# Patient Record
Sex: MALE | Race: WHITE | NOT HISPANIC OR LATINO | URBAN - METROPOLITAN AREA
[De-identification: names, ages, dates, MRNs, and addresses within clinical notes are randomized per-mention and may not be internally consistent; named-entity substitution may affect disease eponyms.]

---

## 2017-02-06 ENCOUNTER — EMERGENCY (EMERGENCY)
Facility: HOSPITAL | Age: 47
LOS: 1 days | Discharge: HOME | End: 2017-02-06
Attending: EMERGENCY MEDICINE | Admitting: EMERGENCY MEDICINE
Payer: MEDICARE

## 2017-02-06 VITALS
HEART RATE: 105 BPM | OXYGEN SATURATION: 95 % | SYSTOLIC BLOOD PRESSURE: 129 MMHG | DIASTOLIC BLOOD PRESSURE: 84 MMHG | WEIGHT: 130.07 LBS | RESPIRATION RATE: 16 BRPM | TEMPERATURE: 98 F

## 2017-02-06 DIAGNOSIS — Z88.0 ALLERGY STATUS TO PENICILLIN: ICD-10-CM

## 2017-02-06 DIAGNOSIS — E86.0 DEHYDRATION: ICD-10-CM

## 2017-02-06 DIAGNOSIS — Z91.012 ALLERGY TO EGGS: ICD-10-CM

## 2017-02-06 DIAGNOSIS — K62.5 HEMORRHAGE OF ANUS AND RECTUM: ICD-10-CM

## 2017-02-06 DIAGNOSIS — Z91.018 ALLERGY TO OTHER FOODS: ICD-10-CM

## 2017-02-06 LAB
ANION GAP SERPL CALC-SCNC: 8 MMOL/L — SIGNIFICANT CHANGE UP (ref 5–17)
BUN SERPL-MCNC: 15 MG/DL — SIGNIFICANT CHANGE UP (ref 7–23)
CALCIUM SERPL-MCNC: 9.3 MG/DL — SIGNIFICANT CHANGE UP (ref 8.5–10.1)
CHLORIDE SERPL-SCNC: 106 MMOL/L — SIGNIFICANT CHANGE UP (ref 96–108)
CO2 SERPL-SCNC: 26 MMOL/L — SIGNIFICANT CHANGE UP (ref 22–31)
CREAT SERPL-MCNC: 0.74 MG/DL — SIGNIFICANT CHANGE UP (ref 0.5–1.3)
GLUCOSE SERPL-MCNC: 100 MG/DL — HIGH (ref 70–99)
SODIUM SERPL-SCNC: 140 MMOL/L — SIGNIFICANT CHANGE UP (ref 135–145)

## 2017-02-06 PROCEDURE — 99284 EMERGENCY DEPT VISIT MOD MDM: CPT

## 2017-02-06 NOTE — ED PROVIDER NOTE - CARE PLAN
Principal Discharge DX:	Rectal bleed Principal Discharge DX:	Rectal bleed  Secondary Diagnosis:	Dehydration

## 2017-02-06 NOTE — ED PROVIDER NOTE - OBJECTIVE STATEMENT
47yo male bib ems with episode of rectal bleeding. according to the worker at he home pt had diarrhea, and they noticed blood in the diaper, no vomiting, no fever, no cough, hx from pt is unable to be obtained because pt is nonverbal

## 2017-02-07 ENCOUNTER — EMERGENCY (EMERGENCY)
Facility: HOSPITAL | Age: 47
LOS: 1 days | Discharge: HOME | End: 2017-02-07
Attending: EMERGENCY MEDICINE | Admitting: EMERGENCY MEDICINE
Payer: MEDICARE

## 2017-02-07 VITALS
RESPIRATION RATE: 16 BRPM | OXYGEN SATURATION: 97 % | WEIGHT: 130.07 LBS | HEART RATE: 84 BPM | SYSTOLIC BLOOD PRESSURE: 124 MMHG | DIASTOLIC BLOOD PRESSURE: 84 MMHG

## 2017-02-07 VITALS
HEART RATE: 80 BPM | OXYGEN SATURATION: 97 % | DIASTOLIC BLOOD PRESSURE: 86 MMHG | TEMPERATURE: 97 F | SYSTOLIC BLOOD PRESSURE: 126 MMHG | RESPIRATION RATE: 18 BRPM

## 2017-02-07 DIAGNOSIS — L89.90 PRESSURE ULCER OF UNSPECIFIED SITE, UNSPECIFIED STAGE: ICD-10-CM

## 2017-02-07 DIAGNOSIS — Z88.0 ALLERGY STATUS TO PENICILLIN: ICD-10-CM

## 2017-02-07 DIAGNOSIS — F79 UNSPECIFIED INTELLECTUAL DISABILITIES: ICD-10-CM

## 2017-02-07 DIAGNOSIS — K62.5 HEMORRHAGE OF ANUS AND RECTUM: ICD-10-CM

## 2017-02-07 LAB
BASOPHILS # BLD AUTO: 0.2 K/UL — SIGNIFICANT CHANGE UP (ref 0–0.2)
BASOPHILS NFR BLD AUTO: 1.8 % — SIGNIFICANT CHANGE UP (ref 0–2)
EOSINOPHIL # BLD AUTO: 0.3 K/UL — SIGNIFICANT CHANGE UP (ref 0–0.5)
EOSINOPHIL NFR BLD AUTO: 2.7 % — SIGNIFICANT CHANGE UP (ref 0–6)
HCT VFR BLD CALC: 52.9 % — HIGH (ref 39–50)
HGB BLD-MCNC: 18.2 G/DL — HIGH (ref 13–17)
LYMPHOCYTES # BLD AUTO: 2.9 K/UL — SIGNIFICANT CHANGE UP (ref 1–3.3)
LYMPHOCYTES # BLD AUTO: 23.2 % — SIGNIFICANT CHANGE UP (ref 13–44)
MCHC RBC-ENTMCNC: 29.8 PG — SIGNIFICANT CHANGE UP (ref 27–34)
MCHC RBC-ENTMCNC: 34.5 GM/DL — SIGNIFICANT CHANGE UP (ref 32–36)
MCV RBC AUTO: 86.5 FL — SIGNIFICANT CHANGE UP (ref 80–100)
MONOCYTES # BLD AUTO: 0.8 K/UL — SIGNIFICANT CHANGE UP (ref 0–0.9)
MONOCYTES NFR BLD AUTO: 6.2 % — SIGNIFICANT CHANGE UP (ref 1–9)
NEUTROPHILS # BLD AUTO: 8.2 K/UL — HIGH (ref 1.8–7.4)
NEUTROPHILS NFR BLD AUTO: 66.2 % — SIGNIFICANT CHANGE UP (ref 43–77)
PLATELET # BLD AUTO: 211 K/UL — SIGNIFICANT CHANGE UP (ref 150–400)
POTASSIUM SERPL-MCNC: SIGNIFICANT CHANGE UP MMOL/L (ref 3.5–5.3)
POTASSIUM SERPL-SCNC: SIGNIFICANT CHANGE UP MMOL/L (ref 3.5–5.3)
RBC # BLD: 6.11 M/UL — HIGH (ref 4.2–5.8)
RBC # FLD: 13 % — SIGNIFICANT CHANGE UP (ref 10.3–14.5)
WBC # BLD: 12.4 K/UL — HIGH (ref 3.8–10.5)
WBC # FLD AUTO: 12.4 K/UL — HIGH (ref 3.8–10.5)

## 2017-02-07 PROCEDURE — 96360 HYDRATION IV INFUSION INIT: CPT

## 2017-02-07 PROCEDURE — 85027 COMPLETE CBC AUTOMATED: CPT

## 2017-02-07 PROCEDURE — 99284 EMERGENCY DEPT VISIT MOD MDM: CPT | Mod: 25

## 2017-02-07 PROCEDURE — 80048 BASIC METABOLIC PNL TOTAL CA: CPT

## 2017-02-07 PROCEDURE — 99283 EMERGENCY DEPT VISIT LOW MDM: CPT

## 2017-02-07 RX ORDER — SODIUM CHLORIDE 9 MG/ML
1000 INJECTION INTRAMUSCULAR; INTRAVENOUS; SUBCUTANEOUS ONCE
Qty: 0 | Refills: 0 | Status: COMPLETED | OUTPATIENT
Start: 2017-02-07 | End: 2017-02-07

## 2017-02-07 RX ADMIN — SODIUM CHLORIDE 2000 MILLILITER(S): 9 INJECTION INTRAMUSCULAR; INTRAVENOUS; SUBCUTANEOUS at 00:08

## 2017-02-07 NOTE — ED PROVIDER NOTE - MEDICAL DECISION MAKING DETAILS
45yo male no rectal bleeding. pt has decubitus ulcers that are macerated and bleeding, no bleeding from rectum. supportve care and reassurance given to worker

## 2017-02-07 NOTE — ED ADULT NURSE REASSESSMENT NOTE - NS ED NURSE REASSESS COMMENT FT1
Pt d/pat back to group home - excoriation to perineum noted - staff instructed on incontinence care-

## 2017-02-07 NOTE — ED PROVIDER NOTE - OBJECTIVE STATEMENT
47yo male bib ems with rectal bleeding. pt was seen here earlier, with no bleeding per rectum normal lab work and pt was sent home. according to the worker pt had another episode of bleeding no vomiting no cough, no other complaints as per worker

## 2017-02-07 NOTE — ED ADULT NURSE NOTE - OBJECTIVE STATEMENT
Pt from [facility]- was just sent back to facility - aid noticed bleeding in diaper-aid thought pt had rectal bleeding- pt with excoriation to perineal area-

## 2017-04-07 ENCOUNTER — OUTPATIENT (OUTPATIENT)
Dept: OUTPATIENT SERVICES | Facility: HOSPITAL | Age: 47
LOS: 1 days | Discharge: NURSING, REHAB OR LONG-TERM CARE | End: 2017-04-07
Payer: MEDICARE

## 2017-04-07 ENCOUNTER — TRANSCRIPTION ENCOUNTER (OUTPATIENT)
Age: 47
End: 2017-04-07

## 2017-04-07 DIAGNOSIS — K62.5 HEMORRHAGE OF ANUS AND RECTUM: ICD-10-CM

## 2017-04-07 PROCEDURE — 45378 DIAGNOSTIC COLONOSCOPY: CPT

## 2017-04-13 DIAGNOSIS — Z91.018 ALLERGY TO OTHER FOODS: ICD-10-CM

## 2017-04-13 DIAGNOSIS — Z88.0 ALLERGY STATUS TO PENICILLIN: ICD-10-CM

## 2017-04-13 DIAGNOSIS — K62.5 HEMORRHAGE OF ANUS AND RECTUM: ICD-10-CM

## 2017-04-13 DIAGNOSIS — Z91.012 ALLERGY TO EGGS: ICD-10-CM

## 2017-04-13 DIAGNOSIS — F79 UNSPECIFIED INTELLECTUAL DISABILITIES: ICD-10-CM

## 2017-05-23 ENCOUNTER — EMERGENCY (EMERGENCY)
Facility: HOSPITAL | Age: 47
LOS: 1 days | Discharge: HOME | End: 2017-05-23
Attending: EMERGENCY MEDICINE | Admitting: EMERGENCY MEDICINE
Payer: MEDICARE

## 2017-05-23 VITALS
RESPIRATION RATE: 17 BRPM | DIASTOLIC BLOOD PRESSURE: 91 MMHG | TEMPERATURE: 99 F | HEART RATE: 101 BPM | OXYGEN SATURATION: 100 % | SYSTOLIC BLOOD PRESSURE: 135 MMHG

## 2017-05-23 VITALS
HEART RATE: 106 BPM | OXYGEN SATURATION: 96 % | SYSTOLIC BLOOD PRESSURE: 138 MMHG | WEIGHT: 130.07 LBS | RESPIRATION RATE: 16 BRPM | DIASTOLIC BLOOD PRESSURE: 89 MMHG

## 2017-05-23 DIAGNOSIS — R03.0 ELEVATED BLOOD-PRESSURE READING, WITHOUT DIAGNOSIS OF HYPERTENSION: ICD-10-CM

## 2017-05-23 DIAGNOSIS — F79 UNSPECIFIED INTELLECTUAL DISABILITIES: ICD-10-CM

## 2017-05-23 DIAGNOSIS — Z88.0 ALLERGY STATUS TO PENICILLIN: ICD-10-CM

## 2017-05-23 PROCEDURE — 99284 EMERGENCY DEPT VISIT MOD MDM: CPT | Mod: 25

## 2017-05-23 PROCEDURE — 74000: CPT | Mod: 26

## 2017-05-23 PROCEDURE — 74018 RADEX ABDOMEN 1 VIEW: CPT

## 2017-05-23 PROCEDURE — 99284 EMERGENCY DEPT VISIT MOD MDM: CPT

## 2017-05-23 NOTE — ED ADULT NURSE REASSESSMENT NOTE - NS ED NURSE REASSESS COMMENT FT1
Patient is for discharge as per Dr. Seals , Temp=98.7, HR= 103. Called up the facility [facility] and spoke with RN Whitney Garibay of patient being discharge and to be brought back to the facility and told me that patient is being sent here to the hospital for GI reason: 2 times of having liquid stool and a formed stool with abdominal distention. Dr. Seals made aware and ordered for x ray of abdomen.
xray done and seen by Dr. Seals and ordered to discharge the patient. I called up back the facility and spoke with RN Whitney Garibay and told patient will be sent back to the facility. Patient at this time calm with Caregiver at bedside. Called up ambulance awaiting to come.
[facility] came and transported the patient back to the facility via stretcher. Patient is not in any distress, no fever.

## 2017-05-23 NOTE — ED ADULT NURSE NOTE - OBJECTIVE STATEMENT
Patient came in to ED brought in by EMS c/o elevated BP and heart rate. Patient is non verbal with caregiver at bedside.

## 2017-05-23 NOTE — ED PROVIDER NOTE - OBJECTIVE STATEMENT
hx as per ems, 47yo male who presents for elevated BP and HR, unknown HR and BP, but pt is afebrile and not tachycardic at this time, no other copmalints as per group home

## 2017-09-03 ENCOUNTER — EMERGENCY (EMERGENCY)
Facility: HOSPITAL | Age: 47
LOS: 0 days | Discharge: HOME | End: 2017-09-03
Attending: EMERGENCY MEDICINE | Admitting: EMERGENCY MEDICINE
Payer: MEDICARE

## 2017-09-03 VITALS
RESPIRATION RATE: 18 BRPM | DIASTOLIC BLOOD PRESSURE: 103 MMHG | SYSTOLIC BLOOD PRESSURE: 171 MMHG | TEMPERATURE: 98 F | HEIGHT: 70 IN | OXYGEN SATURATION: 100 % | HEART RATE: 104 BPM | WEIGHT: 210.1 LBS

## 2017-09-03 DIAGNOSIS — Z91.012 ALLERGY TO EGGS: ICD-10-CM

## 2017-09-03 DIAGNOSIS — Z88.0 ALLERGY STATUS TO PENICILLIN: ICD-10-CM

## 2017-09-03 DIAGNOSIS — H66.91 OTITIS MEDIA, UNSPECIFIED, RIGHT EAR: ICD-10-CM

## 2017-09-03 DIAGNOSIS — H92.09 OTALGIA, UNSPECIFIED EAR: ICD-10-CM

## 2017-09-03 DIAGNOSIS — F79 UNSPECIFIED INTELLECTUAL DISABILITIES: ICD-10-CM

## 2017-09-03 PROCEDURE — 99283 EMERGENCY DEPT VISIT LOW MDM: CPT

## 2017-09-03 RX ADMIN — Medication 450 MILLIGRAM(S): at 19:16

## 2017-09-03 NOTE — ED PROVIDER NOTE - NS_ ATTENDINGSCRIBEDETAILS _ED_A_ED_FT
I, Abimael Marie MD,  performed the initial face to face bedside interview with this patient regarding history of present illness, review of symptoms and relevant past medical, social and family history.  I completed an independent physical examination.  I was the initial provider who evaluated this patient. I have signed out the follow up of any pending tests (i.e. labs, radiological studies) to the ACP.  I have communicated the patient’s plan of care and disposition with the ACP.  The history, relevant review of systems, past medical and surgical history, medical decision making, and physical examination was documented by the scribe in my presence and I attest to the accuracy of the documentation. I, Abimael Marie MD,  performed the initial face to face bedside interview with this patient regarding history of present illness, review of symptoms and relevant past medical, social and family history.  I completed an independent physical examination.    The history, relevant review of systems, past medical and surgical history, medical decision making, and physical examination was documented by the scribe in my presence and I attest to the accuracy of the documentation.

## 2017-09-03 NOTE — ED ADULT NURSE NOTE - OBJECTIVE STATEMENT
Patient presents with right ear pain. Staff at [facility] states that patient has history of ear infections and has been hitting his right ear and pulling at lobe of right ear. Patient non verbal, patient has MR, staff member on their way to hospital.

## 2017-09-03 NOTE — ED PROVIDER NOTE - OBJECTIVE STATEMENT
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 Group home staff notes pt hitting his right ear today. Also noted change in behavior. Pt has a hx of recurrent right ear infections. Pt is a poor historian, unable to obtain full HPI or ROS.

## 2017-09-03 NOTE — ED ADULT TRIAGE NOTE - CHIEF COMPLAINT QUOTE
pt with MR from group home. as per EMS pt is hitting his right ear so staffa t group home would like to r/o ear infection

## 2017-09-03 NOTE — ED ADULT NURSE REASSESSMENT NOTE - NS ED NURSE REASSESS COMMENT FT1
[facility] ambulance called, ETA of 2000. Patient is awaiting ambulance trip home. Patient and group home staff member updated on plan.

## 2017-09-03 NOTE — ED PROVIDER NOTE - ENMT, MLM
Airway patent, Nasal mucosa clear. Mouth with normal mucosa. Throat has no vesicles, no oropharyngeal exudates and uvula is midline. +right TM erythema, bulging. No canal tenderness. No mastoid sinus tenderness.

## 2018-02-28 ENCOUNTER — EMERGENCY (EMERGENCY)
Facility: HOSPITAL | Age: 48
LOS: 0 days | Discharge: HOME | End: 2018-02-28
Attending: EMERGENCY MEDICINE | Admitting: EMERGENCY MEDICINE
Payer: MEDICARE

## 2018-02-28 VITALS
DIASTOLIC BLOOD PRESSURE: 88 MMHG | RESPIRATION RATE: 16 BRPM | SYSTOLIC BLOOD PRESSURE: 132 MMHG | HEART RATE: 82 BPM | OXYGEN SATURATION: 98 % | TEMPERATURE: 98 F

## 2018-02-28 VITALS
SYSTOLIC BLOOD PRESSURE: 137 MMHG | DIASTOLIC BLOOD PRESSURE: 97 MMHG | RESPIRATION RATE: 16 BRPM | OXYGEN SATURATION: 96 % | HEART RATE: 104 BPM

## 2018-02-28 PROCEDURE — 99282 EMERGENCY DEPT VISIT SF MDM: CPT

## 2018-02-28 NOTE — ED PROVIDER NOTE - OBJECTIVE STATEMENT
46 y/o M w/ pmhx of MR presents to ED c/o foreign body in eye. As per family at bedside pt choked on goldfish around 15:30 and then home nurse saw a white object in his eye. Pt unable to provide adequate HPI due to hx of MR and non-verbal status. 48 y/o M w/ pmhx of MR, left retinal detachment and left eye cataracts, presents to ED c/o foreign body in left eye. As per family at bedside pt choked on goldfish around 15:30, had one episode of vomiting and diarrhea and then home nurse saw a white object in his left eye. Pt unable to provide adequate HPI due to hx of MR and non-verbal status.

## 2018-02-28 NOTE — ED PROVIDER NOTE - UNABLE TO OBTAIN
Pt unable to provide adequate HPI due to hx of MR and non-verbal status. Severe Illness/Injury Unable to provide adequate ROS due to pt hx of MR and nonverbal status.

## 2018-02-28 NOTE — ED ADULT TRIAGE NOTE - CHIEF COMPLAINT QUOTE
patient sent from [facility] for one episode of vomiting and one episode of diarrhea this afternoon, this evening they noted left eye redness patient sent from [facility] for one episode of vomiting and one episode of diarrhea this afternoon, this evening they noted left eye whit spot on pupil

## 2018-02-28 NOTE — ED ADULT NURSE NOTE - CHIEF COMPLAINT QUOTE
patient sent from [facility] for one episode of vomiting and one episode of diarrhea this afternoon, this evening they noted left eye whit spot on pupil

## 2018-02-28 NOTE — ED PROVIDER NOTE - NS_ ATTENDINGSCRIBEDETAILS _ED_A_ED_FT
I, Abimael Marie MD,  performed the initial face to face bedside interview with this patient regarding history of present illness, review of symptoms and relevant past medical, social and family history.  I completed an independent physical examination.    The history, relevant review of systems, past medical and surgical history, medical decision making, and physical examination was documented by the scribe in my presence and I attest to the accuracy of the documentation.

## 2018-02-28 NOTE — ED ADULT NURSE REASSESSMENT NOTE - NS ED NURSE REASSESS COMMENT FT1
pt resting comfortably with ryan mendoza aide @ bedside, awaiting transport back to facility @ approx 2030pm

## 2018-02-28 NOTE — ED PROVIDER NOTE - PROGRESS NOTE DETAILS
PT has hx of retinal detachment and is legally blind seconadry to optic dnerve damage. PT observed in Ed not vomiting, no suspicion for aspiration given physical exam. Pt vomited at 3pm and came in at 6 no coughing, no rhonchi.

## 2018-02-28 NOTE — ED ADULT NURSE NOTE - IS THE PATIENT ABLE TO BE SCREENED?
Patient reports that she has no pain relief. Dr. Charly tiwari.
Patient states that she feels better.
Patient is being PO challenged.  Patient has been given ginger ale, saltines, and regino crackers. Will continue to monitor.
patient denies nausea after PO challenge
No

## 2018-02-28 NOTE — ED ADULT NURSE NOTE - OBJECTIVE STATEMENT
pt sent from [facility],  MR, for white spot noted on left eye after 1 episode of V/D

## 2018-02-28 NOTE — ED ADULT NURSE NOTE - CHPI ED SYMPTOMS NEG
no numbness/no syncope/no change in level of consciousness/no weakness/no chills/no loss of consciousness/no fever

## 2018-03-02 DIAGNOSIS — F79 UNSPECIFIED INTELLECTUAL DISABILITIES: ICD-10-CM

## 2018-03-02 DIAGNOSIS — Z71.1 PERSON WITH FEARED HEALTH COMPLAINT IN WHOM NO DIAGNOSIS IS MADE: ICD-10-CM

## 2018-08-22 ENCOUNTER — OUTPATIENT (OUTPATIENT)
Dept: OUTPATIENT SERVICES | Facility: HOSPITAL | Age: 48
LOS: 1 days | End: 2018-08-22
Payer: MEDICARE

## 2018-08-22 VITALS
WEIGHT: 149.91 LBS | HEART RATE: 94 BPM | SYSTOLIC BLOOD PRESSURE: 118 MMHG | OXYGEN SATURATION: 96 % | DIASTOLIC BLOOD PRESSURE: 85 MMHG | HEIGHT: 61 IN | RESPIRATION RATE: 16 BRPM | TEMPERATURE: 99 F

## 2018-08-22 DIAGNOSIS — K02.62 DENTAL CARIES ON SMOOTH SURFACE PENETRATING INTO DENTIN: ICD-10-CM

## 2018-08-22 DIAGNOSIS — Z98.890 OTHER SPECIFIED POSTPROCEDURAL STATES: Chronic | ICD-10-CM

## 2018-08-22 DIAGNOSIS — K02.9 DENTAL CARIES, UNSPECIFIED: ICD-10-CM

## 2018-08-22 DIAGNOSIS — Z01.818 ENCOUNTER FOR OTHER PREPROCEDURAL EXAMINATION: ICD-10-CM

## 2018-08-22 DIAGNOSIS — K05.6 PERIODONTAL DISEASE, UNSPECIFIED: ICD-10-CM

## 2018-08-22 PROCEDURE — G0463: CPT

## 2018-08-22 RX ORDER — SODIUM CHLORIDE 9 MG/ML
3 INJECTION INTRAMUSCULAR; INTRAVENOUS; SUBCUTANEOUS EVERY 8 HOURS
Qty: 0 | Refills: 0 | Status: DISCONTINUED | OUTPATIENT
Start: 2018-09-05 | End: 2018-09-20

## 2018-08-22 NOTE — H&P PST ADULT - HISTORY OF PRESENT ILLNESS
48 Y/O male H/O Profound mental retardation, legally blind, spastic quadriplegia with disuse atrophy of the distal muscles, Seizures (1978). Presents for Comprehensive dental treatment 9/5/18  ++++unable to do chest x-ray today because metal bars do not come off wheel chair.  Requesting pt to have chest x-ray at home.

## 2018-08-22 NOTE — H&P PST ADULT - PMH
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

## 2018-09-04 ENCOUNTER — TRANSCRIPTION ENCOUNTER (OUTPATIENT)
Age: 48
End: 2018-09-04

## 2018-09-05 ENCOUNTER — OUTPATIENT (OUTPATIENT)
Dept: OUTPATIENT SERVICES | Facility: HOSPITAL | Age: 48
LOS: 1 days | End: 2018-09-05
Payer: MEDICARE

## 2018-09-05 VITALS
HEART RATE: 78 BPM | SYSTOLIC BLOOD PRESSURE: 124 MMHG | RESPIRATION RATE: 16 BRPM | OXYGEN SATURATION: 97 % | DIASTOLIC BLOOD PRESSURE: 77 MMHG

## 2018-09-05 VITALS
DIASTOLIC BLOOD PRESSURE: 82 MMHG | SYSTOLIC BLOOD PRESSURE: 116 MMHG | WEIGHT: 149.91 LBS | TEMPERATURE: 97 F | HEIGHT: 61 IN | RESPIRATION RATE: 16 BRPM | HEART RATE: 84 BPM | OXYGEN SATURATION: 98 %

## 2018-09-05 DIAGNOSIS — Z98.890 OTHER SPECIFIED POSTPROCEDURAL STATES: Chronic | ICD-10-CM

## 2018-09-05 DIAGNOSIS — K05.6 PERIODONTAL DISEASE, UNSPECIFIED: ICD-10-CM

## 2018-09-05 DIAGNOSIS — Z01.818 ENCOUNTER FOR OTHER PREPROCEDURAL EXAMINATION: ICD-10-CM

## 2018-09-05 DIAGNOSIS — K02.62 DENTAL CARIES ON SMOOTH SURFACE PENETRATING INTO DENTIN: ICD-10-CM

## 2018-09-05 RX ORDER — ONDANSETRON 8 MG/1
4 TABLET, FILM COATED ORAL ONCE
Qty: 0 | Refills: 0 | Status: DISCONTINUED | OUTPATIENT
Start: 2018-09-05 | End: 2018-09-20

## 2018-09-05 RX ORDER — SODIUM CHLORIDE 9 MG/ML
1000 INJECTION, SOLUTION INTRAVENOUS
Qty: 0 | Refills: 0 | Status: DISCONTINUED | OUTPATIENT
Start: 2018-09-05 | End: 2018-09-20

## 2018-09-05 NOTE — ASU DISCHARGE PLAN (ADULT/PEDIATRIC). - NOTIFY
Numbness, color, or temperature change to extremity/Bleeding that does not stop/Swelling that continues/Unable to Urinate/Pain not relieved by Medications/Persistent Nausea and Vomiting/Fever greater than 101

## 2018-09-05 NOTE — ASU PREOP CHECKLIST - TO WHOM
Sidra Mullins RN OR nurse KRYSTAL Lopez OR nurse KRYSTAL Lopez/ Report received from Shae Watters RN Admitting

## 2018-09-05 NOTE — BRIEF OPERATIVE NOTE - PROCEDURE
<<-----Click on this checkbox to enter Procedure Dental restoration, tooth extraction, or dental prophylaxis, with general anesthesia  09/05/2018    Active  JOLSHIN

## 2018-09-05 NOTE — PRE-ANESTHESIA EVALUATION ADULT - NSANTHOSAYNRD_GEN_A_CORE
unable to assess per is non verbal/No. RICHARD screening performed.  STOP BANG Legend: 0-2 = LOW Risk; 3-4 = INTERMEDIATE Risk; 5-8 = HIGH Risk

## 2018-09-05 NOTE — BRIEF OPERATIVE NOTE - POST-OP DX
Gingival hyperplasia  09/05/2018    Active  Meron Dempsey  Periodontal disease  09/05/2018    Active  Meron Dempsey

## 2018-09-06 PROCEDURE — D7210: CPT

## 2018-09-06 PROCEDURE — C1889: CPT

## 2018-09-06 PROCEDURE — D4210: CPT

## 2018-09-06 PROCEDURE — D4341: CPT

## 2018-10-24 PROBLEM — D18.03 HEMANGIOMA OF INTRA-ABDOMINAL STRUCTURES: Chronic | Status: ACTIVE | Noted: 2018-08-22

## 2018-10-24 PROBLEM — K52.9 NONINFECTIVE GASTROENTERITIS AND COLITIS, UNSPECIFIED: Chronic | Status: ACTIVE | Noted: 2018-08-22

## 2018-10-24 PROBLEM — M87.051 IDIOPATHIC ASEPTIC NECROSIS OF RIGHT FEMUR: Chronic | Status: ACTIVE | Noted: 2018-08-22

## 2018-10-24 PROBLEM — G82.50 QUADRIPLEGIA, UNSPECIFIED: Chronic | Status: ACTIVE | Noted: 2018-08-22

## 2018-10-24 PROBLEM — S73.005A UNSPECIFIED DISLOCATION OF LEFT HIP, INITIAL ENCOUNTER: Chronic | Status: ACTIVE | Noted: 2018-08-22

## 2018-10-24 PROBLEM — J18.9 PNEUMONIA, UNSPECIFIED ORGANISM: Chronic | Status: ACTIVE | Noted: 2018-08-22

## 2018-10-24 PROBLEM — H54.8 LEGAL BLINDNESS, AS DEFINED IN USA: Chronic | Status: ACTIVE | Noted: 2018-08-22

## 2018-10-24 PROBLEM — M41.9 SCOLIOSIS, UNSPECIFIED: Chronic | Status: ACTIVE | Noted: 2018-08-22

## 2018-11-19 NOTE — ED ADULT NURSE NOTE - DISCHARGE DATE/TIME
Patient: Marie Kline    Procedure(s):  OPEN REDUCTION INTERNAL FIXATION HIP BIPOLAR    Diagnosis:Left femoral neck fracture  Diagnosis Additional Information: Left hip fracture  Dr. Reynolds    Anesthesia Type:  General, ETT, LMA    Note:  Anesthesia Post Evaluation    Patient location during evaluation: PACU  Patient participation: Able to fully participate in evaluation  Level of consciousness: sleepy but conscious  Pain management: adequate  Airway patency: patent  Cardiovascular status: acceptable  Respiratory status: acceptable  Hydration status: acceptable  PONV: none             Last vitals:  Vitals:    11/19/18 0748 11/19/18 1117 11/19/18 1120   BP: 96/65 102/50 96/62   Pulse:      Resp: 16  10   Temp: 98.6  F (37  C)  98.5  F (36.9  C)   SpO2: 96%  100%         Electronically Signed By: Faisal Carter MD  November 19, 2018  11:40 AM  
07-Feb-2017 00:27

## 2018-11-20 ENCOUNTER — TRANSCRIPTION ENCOUNTER (OUTPATIENT)
Age: 48
End: 2018-11-20

## 2018-11-28 ENCOUNTER — APPOINTMENT (OUTPATIENT)
Dept: CT IMAGING | Facility: CLINIC | Age: 48
End: 2018-11-28

## 2019-01-08 NOTE — ED ADULT NURSE NOTE - FINAL NURSING ELECTRONIC SIGNATURE
How Severe Is Your Skin Lesion?: mild
Has Your Skin Lesion Been Treated?: not been treated
Is This A New Presentation, Or A Follow-Up?: Skin Lesion
Additional History: Hx bcc 12/18 right upper lip, s/p Mohs w/Dr. Burrell 12/18.
28-Feb-2018 20:12

## 2020-01-30 ENCOUNTER — OUTPATIENT (OUTPATIENT)
Dept: OUTPATIENT SERVICES | Facility: HOSPITAL | Age: 50
LOS: 1 days | End: 2020-01-30
Payer: MEDICARE

## 2020-01-30 VITALS
WEIGHT: 149.47 LBS | DIASTOLIC BLOOD PRESSURE: 90 MMHG | RESPIRATION RATE: 16 BRPM | OXYGEN SATURATION: 96 % | HEART RATE: 101 BPM | TEMPERATURE: 97 F | HEIGHT: 62 IN | SYSTOLIC BLOOD PRESSURE: 138 MMHG

## 2020-01-30 DIAGNOSIS — Z01.818 ENCOUNTER FOR OTHER PREPROCEDURAL EXAMINATION: ICD-10-CM

## 2020-01-30 DIAGNOSIS — K02.9 DENTAL CARIES, UNSPECIFIED: ICD-10-CM

## 2020-01-30 DIAGNOSIS — K02.62 DENTAL CARIES ON SMOOTH SURFACE PENETRATING INTO DENTIN: ICD-10-CM

## 2020-01-30 DIAGNOSIS — K05.6 PERIODONTAL DISEASE, UNSPECIFIED: ICD-10-CM

## 2020-01-30 DIAGNOSIS — Z98.890 OTHER SPECIFIED POSTPROCEDURAL STATES: Chronic | ICD-10-CM

## 2020-01-30 DIAGNOSIS — K02.9 DENTAL CARIES, UNSPECIFIED: Chronic | ICD-10-CM

## 2020-01-30 LAB
ANION GAP SERPL CALC-SCNC: 13 MMOL/L — SIGNIFICANT CHANGE UP (ref 5–17)
BUN SERPL-MCNC: 15 MG/DL — SIGNIFICANT CHANGE UP (ref 7–23)
CALCIUM SERPL-MCNC: 9.7 MG/DL — SIGNIFICANT CHANGE UP (ref 8.4–10.5)
CHLORIDE SERPL-SCNC: 104 MMOL/L — SIGNIFICANT CHANGE UP (ref 96–108)
CO2 SERPL-SCNC: 23 MMOL/L — SIGNIFICANT CHANGE UP (ref 22–31)
CREAT SERPL-MCNC: 0.56 MG/DL — SIGNIFICANT CHANGE UP (ref 0.5–1.3)
GLUCOSE SERPL-MCNC: 83 MG/DL — SIGNIFICANT CHANGE UP (ref 70–99)
HCT VFR BLD CALC: 51.8 % — HIGH (ref 39–50)
HGB BLD-MCNC: 17.4 G/DL — HIGH (ref 13–17)
MCHC RBC-ENTMCNC: 29.3 PG — SIGNIFICANT CHANGE UP (ref 27–34)
MCHC RBC-ENTMCNC: 33.6 GM/DL — SIGNIFICANT CHANGE UP (ref 32–36)
MCV RBC AUTO: 87.2 FL — SIGNIFICANT CHANGE UP (ref 80–100)
NRBC # BLD: 0 /100 WBCS — SIGNIFICANT CHANGE UP (ref 0–0)
PLATELET # BLD AUTO: 330 K/UL — SIGNIFICANT CHANGE UP (ref 150–400)
POTASSIUM SERPL-MCNC: 4 MMOL/L — SIGNIFICANT CHANGE UP (ref 3.5–5.3)
POTASSIUM SERPL-SCNC: 4 MMOL/L — SIGNIFICANT CHANGE UP (ref 3.5–5.3)
RBC # BLD: 5.94 M/UL — HIGH (ref 4.2–5.8)
RBC # FLD: 14 % — SIGNIFICANT CHANGE UP (ref 10.3–14.5)
SODIUM SERPL-SCNC: 140 MMOL/L — SIGNIFICANT CHANGE UP (ref 135–145)
WBC # BLD: 9.25 K/UL — SIGNIFICANT CHANGE UP (ref 3.8–10.5)
WBC # FLD AUTO: 9.25 K/UL — SIGNIFICANT CHANGE UP (ref 3.8–10.5)

## 2020-01-30 PROCEDURE — 80048 BASIC METABOLIC PNL TOTAL CA: CPT

## 2020-01-30 PROCEDURE — G0463: CPT

## 2020-01-30 PROCEDURE — 85027 COMPLETE CBC AUTOMATED: CPT

## 2020-01-30 RX ORDER — LIDOCAINE HCL 20 MG/ML
0.2 VIAL (ML) INJECTION ONCE
Refills: 0 | Status: DISCONTINUED | OUTPATIENT
Start: 2020-02-07 | End: 2020-02-22

## 2020-01-30 RX ORDER — SODIUM CHLORIDE 9 MG/ML
3 INJECTION INTRAMUSCULAR; INTRAVENOUS; SUBCUTANEOUS EVERY 8 HOURS
Refills: 0 | Status: DISCONTINUED | OUTPATIENT
Start: 2020-02-07 | End: 2020-02-22

## 2020-01-30 NOTE — H&P PST ADULT - MUSCULOSKELETAL COMMENTS
avascular necrosis of both hips, pt wheelchair bound, does not walk wheelchair bound, contracture of BUE

## 2020-01-30 NOTE — H&P PST ADULT - HISTORY OF PRESENT ILLNESS
46 Y/O male H/O Profound mental retardation, legally blind, spastic quadriplegia with disuse atrophy of the distal muscles, Seizures (1978). Presents for Comprehensive dental treatment 9/5/18  ++++unable to do chest x-ray today because metal bars do not come off wheel chair.  Requesting pt to have chest x-ray at home. 48 Y/O male H/O Profound mental retardation, legally blind, spastic quadriplegia with disuse atrophy of the distal muscles, Seizures (1978, pt not on AED medication, rod Lovett stated patient had not had any seizure for years). Presents for Comprehensive dental treatment.

## 2020-01-30 NOTE — H&P PST ADULT - NSICDXPASTMEDICALHX_GEN_ALL_CORE_FT
PAST MEDICAL HISTORY:  Avascular necrosis of bones of both hips     Colitis     Hemangioma of liver gall bladder polyp    Hip dislocation, bilateral chronic    Legally blind     Mental retardation premature birth at 7 month brreach anoxic and resp distress at birth    Pneumonia 2004    Scoliosis     Seizures 1978    Spastic quadriplegia PAST MEDICAL HISTORY:  Avascular necrosis of bones of both hips     Colitis     Hemangioma of liver gall bladder polyp    Hip dislocation, bilateral chronic    Legally blind     Mental retardation premature birth at 7 month brreach anoxic and resp distress at birth    Pneumonia 2004    Scoliosis     Seizures last seizure 1978    Spastic quadriplegia

## 2020-01-30 NOTE — H&P PST ADULT - NSICDXPASTSURGICALHX_GEN_ALL_CORE_FT
PAST SURGICAL HISTORY:  H/O colonoscopy     S/P eye surgery retinal tears PAST SURGICAL HISTORY:  Dental caries s/p comprehensive dental treatment by Dr. Dempsey 2018    H/O colonoscopy     S/P eye surgery retinal tears

## 2020-01-30 NOTE — H&P PST ADULT - NSICDXPROBLEM_GEN_ALL_CORE_FT
PROBLEM DIAGNOSES  Problem: Dental caries of smooth surface  Assessment and Plan: comprehensive dental treatment

## 2020-01-30 NOTE — H&P PST ADULT - ACTIVITY
Needs assist with all ADL's Needs assist with most ADL's, needs set up for food, able to feed himself with spoon

## 2020-01-30 NOTE — H&P PST ADULT - GASTROINTESTINAL COMMENTS
ground diet, needs meals to be set up, however pt able to feed himself with spoon.  Gissell reports pt swallows well, no sign of aspiration risk

## 2020-02-06 ENCOUNTER — TRANSCRIPTION ENCOUNTER (OUTPATIENT)
Age: 50
End: 2020-02-06

## 2020-02-06 NOTE — ASU DISCHARGE PLAN (ADULT/PEDIATRIC) - CALL YOUR DOCTOR IF YOU HAVE ANY OF THE FOLLOWING:
Fever greater than (need to indicate Fahrenheit or Celsius)/Swelling that gets worse/Pain not relieved by Medications/101/Bleeding that does not stop

## 2020-02-06 NOTE — ASU DISCHARGE PLAN (ADULT/PEDIATRIC) - ASU DC SPECIAL INSTRUCTIONSFT
comprehensive dental tx under general anesthesia    tylenol prn pain    see Dr Dempsey in one week    return to program tomorrow

## 2020-02-07 ENCOUNTER — OUTPATIENT (OUTPATIENT)
Dept: OUTPATIENT SERVICES | Facility: HOSPITAL | Age: 50
LOS: 1 days | Discharge: HOME | End: 2020-02-07
Payer: MEDICARE

## 2020-02-07 VITALS
HEART RATE: 88 BPM | RESPIRATION RATE: 16 BRPM | TEMPERATURE: 98 F | DIASTOLIC BLOOD PRESSURE: 85 MMHG | SYSTOLIC BLOOD PRESSURE: 119 MMHG | OXYGEN SATURATION: 100 %

## 2020-02-07 VITALS
WEIGHT: 149.47 LBS | SYSTOLIC BLOOD PRESSURE: 140 MMHG | DIASTOLIC BLOOD PRESSURE: 92 MMHG | HEART RATE: 96 BPM | OXYGEN SATURATION: 95 % | RESPIRATION RATE: 16 BRPM | TEMPERATURE: 98 F | HEIGHT: 62 IN

## 2020-02-07 DIAGNOSIS — Z98.890 OTHER SPECIFIED POSTPROCEDURAL STATES: Chronic | ICD-10-CM

## 2020-02-07 DIAGNOSIS — K02.62 DENTAL CARIES ON SMOOTH SURFACE PENETRATING INTO DENTIN: ICD-10-CM

## 2020-02-07 DIAGNOSIS — K05.6 PERIODONTAL DISEASE, UNSPECIFIED: ICD-10-CM

## 2020-02-07 DIAGNOSIS — K02.9 DENTAL CARIES, UNSPECIFIED: Chronic | ICD-10-CM

## 2020-02-07 PROCEDURE — D0220: CPT

## 2020-02-07 PROCEDURE — D4210: CPT

## 2020-02-07 PROCEDURE — D4211: CPT

## 2020-02-07 PROCEDURE — D0230: CPT

## 2020-02-07 PROCEDURE — D4341: CPT

## 2020-02-07 PROCEDURE — D1110: CPT

## 2020-02-07 NOTE — ASU PATIENT PROFILE, ADULT - PMH
Avascular necrosis of bones of both hips    Colitis    Hemangioma of liver  gall bladder polyp  Hip dislocation, bilateral  chronic  Legally blind    Mental retardation  premature birth at 7 month romeo anoxic and resp distress at birth  Pneumonia  2004  Scoliosis    Seizures  last seizure 1978  Spastic quadriplegia

## 2020-02-07 NOTE — ASU PATIENT PROFILE, ADULT - PSH
Dental caries  s/p comprehensive dental treatment by Dr. Dempsey 2018  H/O colonoscopy    S/P eye surgery  retinal tears

## 2020-02-07 NOTE — ASU PATIENT PROFILE, ADULT - VISION (WITH CORRECTIVE LENSES IF THE PATIENT USUALLY WEARS THEM):
4
Partially impaired: cannot see medication labels or newsprint, but can see obstacles in path, and the surrounding layout; can count fingers at arm's length

## 2020-05-19 ENCOUNTER — EMERGENCY (EMERGENCY)
Facility: HOSPITAL | Age: 50
LOS: 1 days | End: 2020-05-19
Admitting: EMERGENCY MEDICINE
Payer: MEDICARE

## 2020-05-19 DIAGNOSIS — Z98.890 OTHER SPECIFIED POSTPROCEDURAL STATES: Chronic | ICD-10-CM

## 2020-05-19 DIAGNOSIS — K02.9 DENTAL CARIES, UNSPECIFIED: Chronic | ICD-10-CM

## 2020-05-19 PROCEDURE — 99284 EMERGENCY DEPT VISIT MOD MDM: CPT

## 2020-05-19 PROCEDURE — 74176 CT ABD & PELVIS W/O CONTRAST: CPT | Mod: 26

## 2020-07-31 NOTE — ED PROVIDER NOTE - DIAGNOSIS COUNSELING, MDM
conducted a detailed discussion... I had a detailed discussion with the patient and/or guardian regarding the historical points, exam findings, and any diagnostic results supporting the discharge/admit diagnosis. Double O-Z Plasty Text: The defect edges were debeveled with a #15 scalpel blade.  Given the location of the defect, shape of the defect and the proximity to free margins a Double O-Z plasty (double transposition flap) was deemed most appropriate.  Using a sterile surgical marker, the appropriate transposition flaps were drawn incorporating the defect and placing the expected incisions within the relaxed skin tension lines where possible. The area thus outlined was incised deep to adipose tissue with a #15 scalpel blade.  The skin margins were undermined to an appropriate distance in all directions utilizing iris scissors.  Hemostasis was achieved with electrocautery.  The flaps were then transposed into place, one clockwise and the other counterclockwise, and anchored with interrupted buried subcutaneous sutures.

## 2021-07-26 PROBLEM — R56.9 UNSPECIFIED CONVULSIONS: Chronic | Status: ACTIVE | Noted: 2018-08-22

## 2021-08-06 ENCOUNTER — OUTPATIENT (OUTPATIENT)
Dept: OUTPATIENT SERVICES | Facility: HOSPITAL | Age: 51
LOS: 1 days | End: 2021-08-06
Payer: MEDICARE

## 2021-08-06 VITALS
TEMPERATURE: 98 F | RESPIRATION RATE: 18 BRPM | WEIGHT: 137.35 LBS | OXYGEN SATURATION: 96 % | SYSTOLIC BLOOD PRESSURE: 100 MMHG | DIASTOLIC BLOOD PRESSURE: 78 MMHG | HEART RATE: 86 BPM

## 2021-08-06 DIAGNOSIS — F79 UNSPECIFIED INTELLECTUAL DISABILITIES: ICD-10-CM

## 2021-08-06 DIAGNOSIS — K02.9 DENTAL CARIES, UNSPECIFIED: Chronic | ICD-10-CM

## 2021-08-06 DIAGNOSIS — K05.6 PERIODONTAL DISEASE, UNSPECIFIED: ICD-10-CM

## 2021-08-06 DIAGNOSIS — Z01.818 ENCOUNTER FOR OTHER PREPROCEDURAL EXAMINATION: ICD-10-CM

## 2021-08-06 DIAGNOSIS — K02.62 DENTAL CARIES ON SMOOTH SURFACE PENETRATING INTO DENTIN: ICD-10-CM

## 2021-08-06 DIAGNOSIS — Z98.890 OTHER SPECIFIED POSTPROCEDURAL STATES: Chronic | ICD-10-CM

## 2021-08-06 DIAGNOSIS — K02.9 DENTAL CARIES, UNSPECIFIED: ICD-10-CM

## 2021-08-06 LAB
ALBUMIN SERPL ELPH-MCNC: 4.3 G/DL — SIGNIFICANT CHANGE UP (ref 3.3–5)
ALP SERPL-CCNC: 97 U/L — SIGNIFICANT CHANGE UP (ref 40–120)
ALT FLD-CCNC: 26 U/L — SIGNIFICANT CHANGE UP (ref 10–45)
ANION GAP SERPL CALC-SCNC: 15 MMOL/L — SIGNIFICANT CHANGE UP (ref 5–17)
AST SERPL-CCNC: 16 U/L — SIGNIFICANT CHANGE UP (ref 10–40)
BILIRUB SERPL-MCNC: 0.4 MG/DL — SIGNIFICANT CHANGE UP (ref 0.2–1.2)
BUN SERPL-MCNC: 17 MG/DL — SIGNIFICANT CHANGE UP (ref 7–23)
CALCIUM SERPL-MCNC: 9.6 MG/DL — SIGNIFICANT CHANGE UP (ref 8.4–10.5)
CHLORIDE SERPL-SCNC: 103 MMOL/L — SIGNIFICANT CHANGE UP (ref 96–108)
CO2 SERPL-SCNC: 21 MMOL/L — LOW (ref 22–31)
CREAT SERPL-MCNC: 0.65 MG/DL — SIGNIFICANT CHANGE UP (ref 0.5–1.3)
GLUCOSE SERPL-MCNC: 99 MG/DL — SIGNIFICANT CHANGE UP (ref 70–99)
HCT VFR BLD CALC: 50.9 % — HIGH (ref 39–50)
HGB BLD-MCNC: 16.9 G/DL — SIGNIFICANT CHANGE UP (ref 13–17)
MCHC RBC-ENTMCNC: 28.8 PG — SIGNIFICANT CHANGE UP (ref 27–34)
MCHC RBC-ENTMCNC: 33.2 GM/DL — SIGNIFICANT CHANGE UP (ref 32–36)
MCV RBC AUTO: 86.7 FL — SIGNIFICANT CHANGE UP (ref 80–100)
NRBC # BLD: 0 /100 WBCS — SIGNIFICANT CHANGE UP (ref 0–0)
PLATELET # BLD AUTO: 330 K/UL — SIGNIFICANT CHANGE UP (ref 150–400)
POTASSIUM SERPL-MCNC: 4.3 MMOL/L — SIGNIFICANT CHANGE UP (ref 3.5–5.3)
POTASSIUM SERPL-SCNC: 4.3 MMOL/L — SIGNIFICANT CHANGE UP (ref 3.5–5.3)
PROT SERPL-MCNC: 7.4 G/DL — SIGNIFICANT CHANGE UP (ref 6–8.3)
RBC # BLD: 5.87 M/UL — HIGH (ref 4.2–5.8)
RBC # FLD: 13.6 % — SIGNIFICANT CHANGE UP (ref 10.3–14.5)
SODIUM SERPL-SCNC: 139 MMOL/L — SIGNIFICANT CHANGE UP (ref 135–145)
WBC # BLD: 9.19 K/UL — SIGNIFICANT CHANGE UP (ref 3.8–10.5)
WBC # FLD AUTO: 9.19 K/UL — SIGNIFICANT CHANGE UP (ref 3.8–10.5)

## 2021-08-06 PROCEDURE — 85027 COMPLETE CBC AUTOMATED: CPT

## 2021-08-06 PROCEDURE — 80053 COMPREHEN METABOLIC PANEL: CPT

## 2021-08-06 PROCEDURE — G0463: CPT

## 2021-08-06 RX ORDER — LIDOCAINE HCL 20 MG/ML
0.2 VIAL (ML) INJECTION ONCE
Refills: 0 | Status: DISCONTINUED | OUTPATIENT
Start: 2021-08-20 | End: 2021-08-20

## 2021-08-06 RX ORDER — SODIUM CHLORIDE 9 MG/ML
3 INJECTION INTRAMUSCULAR; INTRAVENOUS; SUBCUTANEOUS EVERY 8 HOURS
Refills: 0 | Status: DISCONTINUED | OUTPATIENT
Start: 2021-08-20 | End: 2021-08-20

## 2021-08-06 RX ORDER — MULTIVIT-MIN/FERROUS GLUCONATE 9 MG/15 ML
9 LIQUID (ML) ORAL
Qty: 0 | Refills: 0 | DISCHARGE

## 2021-08-06 RX ORDER — CHOLECALCIFEROL (VITAMIN D3) 125 MCG
1 CAPSULE ORAL
Qty: 0 | Refills: 0 | DISCHARGE

## 2021-08-06 RX ORDER — PSYLLIUM SEED (WITH DEXTROSE)
0 POWDER (GRAM) ORAL
Qty: 0 | Refills: 0 | DISCHARGE

## 2021-08-06 NOTE — H&P PST ADULT - NSICDXPASTSURGICALHX_GEN_ALL_CORE_FT
PAST SURGICAL HISTORY:  Dental caries s/p comprehensive dental treatment by Dr. Dempsey 2018    H/O colonoscopy     S/P eye surgery retinal tears

## 2021-08-06 NOTE — H&P PST ADULT - NSANTHOSAYNRD_GEN_A_CORE
mentally challenge, non verbal/No. RICHARD screening performed.  STOP BANG Legend: 0-2 = LOW Risk; 3-4 = INTERMEDIATE Risk; 5-8 = HIGH Risk

## 2021-08-06 NOTE — H&P PST ADULT - HISTORY OF PRESENT ILLNESS
50 year old wheelchair bound male with severe mental retardation , blindness, scoliosis, hemangioma of liver  50 year old wheelchair bound male with severe mental retardation, blindness, scoliosis, hemangioma of liver, spasticity quad with disuse atrophy, chronic bilateral hips dislocation 2/2 avascular necrosis, one time seizure 1978 (no medication), osteoporosis, bilateral cataract, chronic constipation, allergic rhinitis, incontinence of bowel and bladder, is being seen in preadmission testing for scheduled comprehensive dental treatment under anesthesia. Medical information obtained from record.    Patient to have covid-19 PCR swab done prior to procedure, house staff notified

## 2021-08-06 NOTE — H&P PST ADULT - NSICDXPASTMEDICALHX_GEN_ALL_CORE_FT
PAST MEDICAL HISTORY:  Avascular necrosis of bones of both hips     Colitis     Hemangioma of liver gall bladder polyp    Hip dislocation, bilateral chronic    Legally blind     Mental retardation premature birth at 7 month brreach anoxic and resp distress at birth    Pneumonia 2004    Scoliosis     Seizures last seizure 1978    Spastic quadriplegia

## 2021-08-06 NOTE — H&P PST ADULT - SURGICAL SITE INCISION
[FreeTextEntry1] : follow up dm, htn, hyperlipidemia [de-identified] : patient has been well\par planning trip to [place]\par patient has no chest pain sob nvd or palpitations\par patient feels good otherwise\par needs renewal of is meds.  no

## 2021-08-06 NOTE — H&P PST ADULT - NSICDXPROBLEM_GEN_ALL_CORE_FT
PROBLEM DIAGNOSES  Problem: Dental caries  Assessment and Plan: scheduled for comprehensive dental treatment under anesthesia   group home staff informed patient needs covid-19 pcr swab 72 hours prior to procedure.  no oral medication on DOS    Problem: Mentally challenged  Assessment and Plan: Maintain safety

## 2021-08-17 ENCOUNTER — OUTPATIENT (OUTPATIENT)
Dept: OUTPATIENT SERVICES | Facility: HOSPITAL | Age: 51
LOS: 1 days | End: 2021-08-17

## 2021-08-17 DIAGNOSIS — Z98.890 OTHER SPECIFIED POSTPROCEDURAL STATES: Chronic | ICD-10-CM

## 2021-08-17 DIAGNOSIS — K02.9 DENTAL CARIES, UNSPECIFIED: Chronic | ICD-10-CM

## 2021-08-19 ENCOUNTER — TRANSCRIPTION ENCOUNTER (OUTPATIENT)
Age: 51
End: 2021-08-19

## 2021-08-19 NOTE — ASU DISCHARGE PLAN (ADULT/PEDIATRIC) - ACCOMPANIED BY
Telephone Encounter by Kolb-Phalen, Laurie, RN at 05/24/17 10:20 AM     Author:  Kolb-Phalen, Laurie, RN Service:  (none) Author Type:  Registered Nurse     Filed:  05/24/17 10:21 AM Encounter Date:  5/24/2017 Status:  Signed     :  Kolb-Phalen, Laurie, RN (Registered Nurse)            Patient notified.  Kelly verbalized understanding of information given.[LK1.1T]  patient transferred to central scheduling for Level II u/s appointment +MFM.[LK1.1M]        Revision History        User Key Date/Time User Provider Type Action    > LK1.1 05/24/17 10:21 AM Kolb-Phalen, Laurie, RN Registered Nurse Sign    M - Manual, T - Template             Staff/Other (specify)

## 2021-08-19 NOTE — ASU DISCHARGE PLAN (ADULT/PEDIATRIC) - ASU DC SPECIAL INSTRUCTIONSFT
comprehensive dental treatment under general anesthesia     tylenol prn pain    see Dr Dempsey in one week at [facility] [id number]     resume all medications     return to program on Sunday comprehensive dental treatment under general anesthesia     tylenol prn pain    see Dr Dempsey in one week at [facility] [id number]     resume all medications     return to program on Sunday    no straw for two days, keep head elevated for the next two days and nights     one extraction performed to the upper right and two to the upper left

## 2021-08-20 ENCOUNTER — OUTPATIENT (OUTPATIENT)
Dept: OUTPATIENT SERVICES | Facility: HOSPITAL | Age: 51
LOS: 1 days | End: 2021-08-20
Payer: MEDICARE

## 2021-08-20 ENCOUNTER — TRANSCRIPTION ENCOUNTER (OUTPATIENT)
Age: 51
End: 2021-08-20

## 2021-08-20 VITALS
DIASTOLIC BLOOD PRESSURE: 75 MMHG | OXYGEN SATURATION: 98 % | SYSTOLIC BLOOD PRESSURE: 110 MMHG | RESPIRATION RATE: 16 BRPM | HEART RATE: 95 BPM | TEMPERATURE: 97 F

## 2021-08-20 VITALS
TEMPERATURE: 99 F | WEIGHT: 137.35 LBS | OXYGEN SATURATION: 100 % | SYSTOLIC BLOOD PRESSURE: 110 MMHG | DIASTOLIC BLOOD PRESSURE: 79 MMHG | HEIGHT: 60 IN | HEART RATE: 55 BPM | RESPIRATION RATE: 18 BRPM

## 2021-08-20 DIAGNOSIS — K02.9 DENTAL CARIES, UNSPECIFIED: Chronic | ICD-10-CM

## 2021-08-20 DIAGNOSIS — Z98.890 OTHER SPECIFIED POSTPROCEDURAL STATES: Chronic | ICD-10-CM

## 2021-08-20 DIAGNOSIS — Z01.818 ENCOUNTER FOR OTHER PREPROCEDURAL EXAMINATION: ICD-10-CM

## 2021-08-20 DIAGNOSIS — K02.62 DENTAL CARIES ON SMOOTH SURFACE PENETRATING INTO DENTIN: ICD-10-CM

## 2021-08-20 DIAGNOSIS — K05.6 PERIODONTAL DISEASE, UNSPECIFIED: ICD-10-CM

## 2021-08-20 PROCEDURE — D1110: CPT

## 2021-08-20 PROCEDURE — D7210: CPT

## 2021-08-20 PROCEDURE — C9399: CPT

## 2021-08-20 PROCEDURE — D4341: CPT

## 2021-08-20 PROCEDURE — D4210: CPT

## 2021-08-20 RX ORDER — PSYLLIUM SEED (WITH DEXTROSE)
0 POWDER (GRAM) ORAL
Qty: 0 | Refills: 0 | DISCHARGE

## 2021-08-20 RX ORDER — FAMOTIDINE 10 MG/ML
1 INJECTION INTRAVENOUS
Qty: 0 | Refills: 0 | DISCHARGE

## 2021-08-20 RX ORDER — KETOCONAZOLE 20 MG/G
1 AEROSOL, FOAM TOPICAL
Qty: 0 | Refills: 0 | DISCHARGE

## 2021-08-20 RX ORDER — ONDANSETRON 8 MG/1
4 TABLET, FILM COATED ORAL ONCE
Refills: 0 | Status: DISCONTINUED | OUTPATIENT
Start: 2021-08-20 | End: 2021-08-20

## 2021-08-20 RX ORDER — SODIUM CHLORIDE 9 MG/ML
1000 INJECTION, SOLUTION INTRAVENOUS
Refills: 0 | Status: DISCONTINUED | OUTPATIENT
Start: 2021-08-20 | End: 2021-08-20

## 2021-08-20 RX ORDER — CHOLECALCIFEROL (VITAMIN D3) 125 MCG
1 CAPSULE ORAL
Qty: 0 | Refills: 0 | DISCHARGE

## 2021-08-20 RX ORDER — CALCIUM CARBONATE 500(1250)
1 TABLET ORAL
Qty: 0 | Refills: 0 | DISCHARGE

## 2021-08-20 RX ORDER — ASPIRIN/CALCIUM CARB/MAGNESIUM 324 MG
1 TABLET ORAL
Qty: 0 | Refills: 0 | DISCHARGE

## 2021-08-20 RX ORDER — MULTIVIT WITH MIN/MFOLATE/K2 340-15/3 G
2 POWDER (GRAM) ORAL
Qty: 0 | Refills: 0 | DISCHARGE

## 2021-08-20 RX ORDER — POLYETHYLENE GLYCOL 3350 17 G/17G
0 POWDER, FOR SOLUTION ORAL
Qty: 0 | Refills: 0 | DISCHARGE

## 2021-08-20 RX ORDER — ALENDRONATE SODIUM 70 MG/1
70 TABLET ORAL
Qty: 0 | Refills: 0 | DISCHARGE

## 2021-08-20 RX ORDER — TAMSULOSIN HYDROCHLORIDE 0.4 MG/1
1 CAPSULE ORAL
Qty: 0 | Refills: 0 | DISCHARGE

## 2021-08-20 RX ORDER — LACTULOSE 10 G/15ML
0 SOLUTION ORAL
Qty: 0 | Refills: 0 | DISCHARGE

## 2021-08-20 NOTE — DISCHARGE NOTE NURSING/CASE MANAGEMENT/SOCIAL WORK - PATIENT PORTAL LINK FT
You can access the FollowMyHealth Patient Portal offered by [facility] by registering at the following website: [email or web address]. By joining [facility]’s FollowMyHealth portal, you will also be able to view your health information using other applications (apps) compatible with our system.

## 2021-08-20 NOTE — DISCHARGE NOTE NURSING/CASE MANAGEMENT/SOCIAL WORK - NSDCPEFALRISK_GEN_ALL_CORE
For information on Fall & injury Prevention, visit [email or web address]

## 2021-09-10 NOTE — ED PROVIDER NOTE - NS HIV RISK FACTOR YES
no PCP to fax discharge papers to [facility] 83-86% on HFNC 40L 50%, sitting 90 degrees.   denies discomfort and denies difficulty breathing; axo4 Declined

## 2021-11-03 ENCOUNTER — EMERGENCY (EMERGENCY)
Facility: HOSPITAL | Age: 51
LOS: 1 days | End: 2021-11-03
Admitting: EMERGENCY MEDICINE
Payer: MEDICARE

## 2021-11-03 DIAGNOSIS — Z98.890 OTHER SPECIFIED POSTPROCEDURAL STATES: Chronic | ICD-10-CM

## 2021-11-03 DIAGNOSIS — K02.9 DENTAL CARIES, UNSPECIFIED: Chronic | ICD-10-CM

## 2021-11-03 PROCEDURE — 99284 EMERGENCY DEPT VISIT MOD MDM: CPT | Mod: GC

## 2021-11-03 PROCEDURE — 71045 X-RAY EXAM CHEST 1 VIEW: CPT | Mod: 26

## 2021-11-03 PROCEDURE — 93010 ELECTROCARDIOGRAM REPORT: CPT

## 2021-11-03 PROCEDURE — 74176 CT ABD & PELVIS W/O CONTRAST: CPT | Mod: 26

## 2021-11-09 ENCOUNTER — OUTPATIENT (OUTPATIENT)
Dept: OUTPATIENT SERVICES | Facility: HOSPITAL | Age: 51
LOS: 1 days | End: 2021-11-09

## 2021-11-09 DIAGNOSIS — K02.9 DENTAL CARIES, UNSPECIFIED: Chronic | ICD-10-CM

## 2021-11-09 DIAGNOSIS — Z98.890 OTHER SPECIFIED POSTPROCEDURAL STATES: Chronic | ICD-10-CM

## 2022-08-31 NOTE — ED ADULT TRIAGE NOTE - WEIGHT IN KG
59 Ketoconazole Pregnancy And Lactation Text: This medication is Pregnancy Category C and it isn't know if it is safe during pregnancy. It is also excreted in breast milk and breast feeding isn't recommended.

## 2022-11-28 NOTE — H&P PST ADULT - NSSUBSTANCEUSE_GEN_ALL_CORE_SD
The facility called the pharmacy and cancelled the RX on 10-19-22 #60 x 5 . Patient does need new RX sent d/t being cancelled. never used

## 2022-12-05 NOTE — ASU PREOP CHECKLIST - ISOLATION PRECAUTIONS
[FreeTextEntry1] : I saw  patient Ava Mcguire in the office today.  The patient is a 33-year-old female who is seen previously for persistent diarrhea.  The symptoms happen after the patient underwent a cholecystectomy.  The patient denies a history of blood in the stool or on the toilet tissue and her appetite remains good with no dysphagia or unexplained weight loss.  The patient had been doing very well by taking cholestyramine 1 packet daily.  She originally started twice a day but this made her constipated.  With this regimen the patient had a well-formed bowel movement.  Also recently the patient developed the flu and was not eating.  She subsequently developed nausea, vomiting and diarrhea and was not sure if this was an exacerbation of her underlying bile malabsorption issue.  The patient does not abuse tobacco caffeine or ethanol. none

## 2023-01-20 NOTE — H&P PST ADULT - SEXUAL ORIENTATION
Straight, Heterosexual Normal vision: sees adequately in most situations; can see medication labels, newsprint

## 2023-05-03 NOTE — ED ADULT NURSE NOTE - ATTEMPT TO OOB
Quality 431: Preventive Care And Screening: Unhealthy Alcohol Use - Screening: Patient not identified as an unhealthy alcohol user when screened for unhealthy alcohol use using a systematic screening method Quality 110: Preventive Care And Screening: Influenza Immunization: Influenza Immunization Administered during Influenza season Detail Level: Detailed Quality 111:Pneumonia Vaccination Status For Older Adults: Patient received any pneumococcal conjugate or polysaccharide vaccine on or after their 60th birthday and before the end of the measurement period no

## 2023-11-14 NOTE — ED PROVIDER NOTE - RECTAL
Pt's wife Jacob Jacob verbalized understanding.  Jacob non-tender

## 2024-06-13 NOTE — ED ADULT NURSE NOTE - ADDITIONAL PRINTED INSTRUCTIONS GIVEN
Attending Attestation (For Attendings USE Only)...
discharge instruction discussed with [facility] personnel and caregiver.

## 2024-07-31 NOTE — H&P PST ADULT - GASTROINTESTINAL
[details… Soft, non-tender, no hepatosplenomegaly, normal bowel sounds

## 2024-09-03 ENCOUNTER — OFFICE (OUTPATIENT)
Dept: URBAN - METROPOLITAN AREA CLINIC 105 | Facility: CLINIC | Age: 54
Setting detail: OPHTHALMOLOGY
End: 2024-09-03
Payer: MEDICARE

## 2024-09-03 DIAGNOSIS — H01.004: ICD-10-CM

## 2024-09-03 DIAGNOSIS — H01.001: ICD-10-CM

## 2024-09-03 PROCEDURE — 92004 COMPRE OPH EXAM NEW PT 1/>: CPT | Performed by: OPTOMETRIST

## 2024-09-03 ASSESSMENT — LID EXAM ASSESSMENTS
OD_BLEPHARITIS: RUL
OS_BLEPHARITIS: LUL

## 2024-09-03 ASSESSMENT — CONFRONTATIONAL VISUAL FIELD TEST (CVF)
OD_FINDINGS: FULL
OS_FINDINGS: FULL

## 2024-09-19 ENCOUNTER — NON-APPOINTMENT (OUTPATIENT)
Age: 54
End: 2024-09-19

## 2024-09-19 DIAGNOSIS — H26.9 UNSPECIFIED CATARACT: ICD-10-CM

## 2024-09-19 DIAGNOSIS — Z78.9 OTHER SPECIFIED HEALTH STATUS: ICD-10-CM

## 2024-09-19 DIAGNOSIS — M81.0 AGE-RELATED OSTEOPOROSIS W/OUT CURRENT PATHOLOGICAL FRACTURE: ICD-10-CM

## 2024-09-19 DIAGNOSIS — M41.20 OTHER IDIOPATHIC SCOLIOSIS, SITE UNSPECIFIED: ICD-10-CM

## 2024-09-19 DIAGNOSIS — G82.50 QUADRIPLEGIA, UNSPECIFIED: ICD-10-CM

## 2024-09-19 DIAGNOSIS — N28.1 CYST OF KIDNEY, ACQUIRED: ICD-10-CM

## 2024-09-19 DIAGNOSIS — D18.03 HEMANGIOMA OF INTRA-ABDOMINAL STRUCTURES: ICD-10-CM

## 2024-09-19 DIAGNOSIS — F79 UNSPECIFIED INTELLECTUAL DISABILITIES: ICD-10-CM

## 2024-09-19 RX ORDER — SIMETHICONE 125 MG/1
125 TABLET, CHEWABLE ORAL
Refills: 0 | Status: ACTIVE | COMMUNITY

## 2024-09-19 RX ORDER — POLYETHYLENE GLYCOL 3350 17 G
17 POWDER IN PACKET (EA) ORAL
Refills: 0 | Status: ACTIVE | COMMUNITY

## 2024-09-19 RX ORDER — LACTULOSE 10 G/15ML
10 SOLUTION ORAL
Refills: 0 | Status: ACTIVE | COMMUNITY

## 2024-09-19 RX ORDER — ASPIRIN 81 MG
81 TABLET, DELAYED RELEASE (ENTERIC COATED) ORAL
Refills: 0 | Status: ACTIVE | COMMUNITY

## 2024-09-19 RX ORDER — CHOLECALCIFEROL (VITAMIN D3) 25 MCG
25 MCG TABLET,CHEWABLE ORAL
Refills: 0 | Status: ACTIVE | COMMUNITY

## 2024-09-19 RX ORDER — TAMSULOSIN HYDROCHLORIDE 0.4 MG/1
0.4 CAPSULE ORAL
Refills: 0 | Status: ACTIVE | COMMUNITY

## 2024-09-19 RX ORDER — POTASSIUM CITRATE 10 MEQ/1
10 MEQ TABLET, EXTENDED RELEASE ORAL
Refills: 0 | Status: ACTIVE | COMMUNITY

## 2024-09-19 RX ORDER — LINACLOTIDE 290 UG/1
290 CAPSULE, GELATIN COATED ORAL
Refills: 0 | Status: ACTIVE | COMMUNITY

## 2024-09-19 RX ORDER — ALENDRONATE SODIUM 70 MG/1
70 TABLET ORAL
Refills: 0 | Status: ACTIVE | COMMUNITY

## 2024-09-19 RX ORDER — DOCUSATE SODIUM 50 MG/5ML
50 LIQUID ORAL
Refills: 0 | Status: ACTIVE | COMMUNITY

## 2024-09-19 RX ORDER — PSYLLIUM SEED (WITH DEXTROSE)
28 POWDER (GRAM) ORAL
Refills: 0 | Status: ACTIVE | COMMUNITY

## 2024-09-19 RX ORDER — FAMOTIDINE 20 MG/1
20 TABLET, FILM COATED ORAL
Refills: 0 | Status: ACTIVE | COMMUNITY

## 2024-09-19 RX ORDER — ATORVASTATIN CALCIUM 10 MG/1
10 TABLET, FILM COATED ORAL
Refills: 0 | Status: ACTIVE | COMMUNITY

## 2024-09-24 ENCOUNTER — NON-APPOINTMENT (OUTPATIENT)
Age: 54
End: 2024-09-24

## 2024-10-20 NOTE — ED PROVIDER NOTE - RESPIRATORY, MLM
[FreeTextEntry1] : EKG shows normal sinus rhythm at 70 BPM.\par  
Breath sounds clear and equal bilaterally.

## 2024-10-23 ENCOUNTER — LABORATORY RESULT (OUTPATIENT)
Age: 54
End: 2024-10-23

## 2024-10-24 ENCOUNTER — LABORATORY RESULT (OUTPATIENT)
Age: 54
End: 2024-10-24

## 2024-11-04 ENCOUNTER — APPOINTMENT (OUTPATIENT)
Age: 54
End: 2024-11-04
Payer: MEDICARE

## 2024-11-04 VITALS — DIASTOLIC BLOOD PRESSURE: 76 MMHG | SYSTOLIC BLOOD PRESSURE: 108 MMHG | WEIGHT: 154 LBS | HEART RATE: 94 BPM

## 2024-11-04 DIAGNOSIS — N28.1 CYST OF KIDNEY, ACQUIRED: ICD-10-CM

## 2024-11-04 DIAGNOSIS — G82.50 QUADRIPLEGIA, UNSPECIFIED: ICD-10-CM

## 2024-11-04 PROCEDURE — 99204 OFFICE O/P NEW MOD 45 MIN: CPT

## 2024-11-04 PROCEDURE — 99214 OFFICE O/P EST MOD 30 MIN: CPT

## 2024-11-04 RX ORDER — ALPRAZOLAM 0.25 MG/1
0.25 TABLET ORAL
Refills: 0 | Status: ACTIVE | COMMUNITY

## 2024-11-04 RX ORDER — ATORVASTATIN CALCIUM 10 MG/1
10 TABLET, FILM COATED ORAL
Refills: 0 | Status: ACTIVE | COMMUNITY

## 2025-01-16 ENCOUNTER — NON-APPOINTMENT (OUTPATIENT)
Age: 55
End: 2025-01-16

## 2025-01-27 ENCOUNTER — NON-APPOINTMENT (OUTPATIENT)
Age: 55
End: 2025-01-27

## 2025-02-19 ENCOUNTER — LABORATORY RESULT (OUTPATIENT)
Age: 55
End: 2025-02-19

## 2025-04-03 ENCOUNTER — NON-APPOINTMENT (OUTPATIENT)
Age: 55
End: 2025-04-03

## 2025-07-11 ENCOUNTER — NON-APPOINTMENT (OUTPATIENT)
Age: 55
End: 2025-07-11